# Patient Record
Sex: FEMALE | Race: WHITE | NOT HISPANIC OR LATINO | Employment: OTHER | URBAN - METROPOLITAN AREA
[De-identification: names, ages, dates, MRNs, and addresses within clinical notes are randomized per-mention and may not be internally consistent; named-entity substitution may affect disease eponyms.]

---

## 2019-01-16 ENCOUNTER — OFFICE VISIT (OUTPATIENT)
Dept: ORTHOPEDIC SURGERY | Facility: CLINIC | Age: 64
End: 2019-01-16

## 2019-01-16 ENCOUNTER — TELEPHONE (OUTPATIENT)
Dept: ORTHOPEDIC SURGERY | Facility: CLINIC | Age: 64
End: 2019-01-16

## 2019-01-16 VITALS — HEIGHT: 65 IN | BODY MASS INDEX: 19.36 KG/M2 | WEIGHT: 116.18 LBS

## 2019-01-16 DIAGNOSIS — M65.9 SYNOVITIS OF LEFT FOOT: Primary | ICD-10-CM

## 2019-01-16 DIAGNOSIS — M19.072 OSTEOARTHRITIS OF LEFT ANKLE OR FOOT: ICD-10-CM

## 2019-01-16 DIAGNOSIS — M18.12 ARTHRITIS OF CARPOMETACARPAL (CMC) JOINT OF LEFT THUMB: ICD-10-CM

## 2019-01-16 DIAGNOSIS — M24.575 CONTRACTURE OF JOINT OF FOOT, LEFT: ICD-10-CM

## 2019-01-16 PROBLEM — M19.079 OSTEOARTHRITIS OF ANKLE OR FOOT: Status: ACTIVE | Noted: 2019-01-16

## 2019-01-16 PROCEDURE — 99204 OFFICE O/P NEW MOD 45 MIN: CPT | Performed by: ORTHOPAEDIC SURGERY

## 2019-01-16 RX ORDER — ASPIRIN 81 MG/1
81 TABLET, CHEWABLE ORAL DAILY
COMMUNITY

## 2019-01-16 RX ORDER — DILTIAZEM HYDROCHLORIDE 120 MG/1
CAPSULE, EXTENDED RELEASE ORAL
COMMUNITY
Start: 2018-10-22

## 2019-01-16 NOTE — PROGRESS NOTES
NEW PATIENT    Patient: Lindsay Morales  : 1955    Primary Care Provider: Ariel Alex MD    Requesting Provider: As above    Pain of the Left Foot      History    Chief Complaint: Left forefoot pain    History of Present Illness: This is an extremely pleasant 63-year-old with left forefoot pain.  It started approximately 2018, she began to have pain in the second and third metatarsal phalangeal joints.  No specific injury.  She and her  are avid runner's, and they had completed a 5K race.  The pain is worse with walking barefoot, that her with padded shoe, worse with activity.  She does have a history of a fracture in that area 8 years ago, but had not had any symptoms since that time.  She had an x-ray done 18, which showed some mild arthritis, and then an MRI done 19, that shows synovitis in the second and third metatarsal phalangeal joint region, some arthritis in the first metatarsal phalangeal joint, old fracture of the proximal phalanx of the third toe.  I reviewed the reports and looked at the MRI.  I looked at her outside records, these are Dr. Alex's.  They also indicate left basal thumb pain, she is wearing a wrist splint.  She saw a hand surgeon at [facility].  She has been advised that she has CMC arthritis.  A compounded cream has been prescribed, she has not tried Voltaren gel.  She finds that the splint does help, but it's bulky.    Current Outpatient Medications on File Prior to Visit   Medication Sig Dispense Refill   • aspirin 81 MG chewable tablet Chew 81 mg Daily.     • Calcium Carbonate-Vit D-Min (CALCIUM 1200 PO) Take  by mouth.     • Calcium Polycarbophil (FIBER-CAPS PO) Take  by mouth.     • CARTIA  MG 24 hr capsule      • Magnesium Hydroxide (MAGNESIA PO) Take  by mouth.     • Multiple Vitamins-Minerals (MULTIVITAMIN ADULT PO) Take  by mouth.       No current facility-administered medications on file prior to visit.      "  Allergies   Allergen Reactions   • Penicillins Hives      Past Medical History:   Diagnosis Date   • Raynaud phenomenon      Past Surgical History:   Procedure Laterality Date   • COLONOSCOPY     • TUBAL ABDOMINAL LIGATION     • VEIN LIGATION AND STRIPPING       Family History   Problem Relation Age of Onset   • Cancer Mother    • Hypertension Mother    • Cancer Father       Social History     Socioeconomic History   • Marital status:      Spouse name: Not on file   • Number of children: Not on file   • Years of education: Not on file   • Highest education level: Not on file   Social Needs   • Financial resource strain: Not on file   • Food insecurity - worry: Not on file   • Food insecurity - inability: Not on file   • Transportation needs - medical: Not on file   • Transportation needs - non-medical: Not on file   Occupational History   • Not on file   Tobacco Use   • Smoking status: Never Smoker   • Smokeless tobacco: Never Used   Substance and Sexual Activity   • Alcohol use: No     Frequency: Never   • Drug use: Defer   • Sexual activity: Defer   Other Topics Concern   • Not on file   Social History Narrative   • Not on file        Review of Systems   Constitutional: Positive for activity change.   HENT: Negative.    Eyes: Negative.    Respiratory: Negative.    Cardiovascular: Negative.    Gastrointestinal: Negative.    Endocrine: Negative.    Genitourinary: Negative.    Musculoskeletal: Positive for joint swelling.   Skin: Negative.    Allergic/Immunologic: Negative.    Neurological: Negative.    Hematological: Negative.    Psychiatric/Behavioral: Negative.        The following portions of the patient's history were reviewed and updated as appropriate: allergies, current medications, past family history, past medical history, past social history, past surgical history and problem list.    Physical Exam:   Ht 165.1 cm (65\")   Wt 52.7 kg (116 lb 2.9 oz)   BMI 19.33 kg/m²   GENERAL: Body habitus: " normal weight for height    Lower extremity edema: Right: none; Leftt: none    Varicose veins:  Right: none; Left: none    Gait: antalgic     Mental Status:  awake and alert; oriented to person, place, and time    Voice:  clear  SKIN:  Normal    Hair Growth:  Right:normal; Left:  normal  NAILS: Toenails: normal  HEENT: Head: Normocephalic, atraumatic,  without obvious abnormality.  eye: normal external eye, no icterus  ears: normal external ears  nose: normal external nose  pharynx: dental hygiene adequate  PULM:  Repiratory effort normal  CV:  Dorsalis Pedis:  Right: 2+; Left:2+    Posterior Tibial: Right:2+; Left:2+    Capillary Refill:  Brisk  MSK:  Hand:right handed and Tender left thumb CMC      Tibia:  Right:  non tender; Left:  non tender      Ankle:  Right: non tender, ROM  normal and symmetric and motor function  normal; Left:  non tender, ROM  normal and symmetric and motor function  normal      Foot:  Right:  non tender, ROM  normal and motor function  normal; Left:  Very tender in the second metatarsal phalangeal joint, moderately tender in the third.  Swelling in both joints.  No significant deformity other than very slight increased distance between the second and third toes.  Stable to testing.  Nontender in the webspaces.  Mild to moderate hallux valgus, but no tenderness in the first metatarsal phalangeal joint, she does have slight decreased range of motion of the first metatarsal phalangeal joint.      NEURO: Heel Walking:  Right:  normal; Left:  normal    Toe Walking:  Right:  normal; Left:  limited ability, painful     Pinon-John Paul 5.07 monofilament test: normal    Lower extremity sensation: intact     Reflexes:  Biceps:  Right:  1+; Left:  1+           Quads:  Right:  2+; Left:  2+           Ankle:  Right:  2+; Left:  2+      Calf Atrophy:none    Motor Function: all 5/5         Medical Decision Making    Data Review:   ordered and reviewed x-rays today, reviewed radiology images, reviewed  radiology results and reviewed outside records    Assessment and Plan/ Diagnosis/Treatment options:   1. Synovitis of left foot  She has synovitis of the left second and third metatarsal phalangeal joints.  I explained that this is how hammertoes start.  I explained to the patient that this is a very common problem, it is commonly hereditary but is made worse by shoe wear.  It commonly presents as a feeling of walking on a marble, or a rock.  It is generally worse barefoot, feels better with padding .  I can be hard to distinguish this from neuroma pain, but neuroma pain is generally worse in a shoe and better barefoot.    If untreated, it can lead to severe deformity of the toe.      I explained the problem to the patient, that the swelling in the joint leads to loosening of the ligaments and gradual deformity.  I explained that the toe can move directly up or sideways and become a crossover toe.  I explained we can call this synovitis, metatarsalgia, early hammertoe, early crossover toe, the result is the same.  The goal of treatment is to prevent severe deformity.  We cannot change the deformity that has already occurred, but we can keep it from getting worse.  I explained that the MRI findings are consistent with this.    I explained it can take 6-12 months for the pain to resolve.  When the pain is gone, the toe will no longer deform.    The treatment is done in a step-wise fashion.  The first stage is three fold: 1. splinting the joint, I showed them how to use either tape or a 2 loop Budin splint.  They should experiment and use whichever is comfortable.  2. custom orthotic to relieve pressure on the joint- I gave them a prescription 3. antiinflammatory to decrease the inflammation, I recommend she take 2 Aleve tablets twice a day.  I wrote this down for her.  I recommend she do this for 12 weeks..      The second stage is a cortisone injection I do not do this the first time because of the increased risk  that the toe will dislocate after injection.  I explained that sometimes these joints can spontaneously dislocate.   .      Surgery is only a very last resort.    I will see her in 12 weeks, standing 2 views of the foot.    2. Contracture of joint of foot, left  As above this is early hammertoe    3. Osteoarthritis of left ankle or foot  She has some arthritic change in the first metatarsal phalangeal joint on the left, not significantly painful right now, but slightly stiffer than the right and that might have increase the likelihood of developing the second and third metatarsal synovitis.  She also has second toe longer than the great toe, which is more commonly prone to developing this synovitis and hammertoe    4. Arthritis of carpometacarpal (CMC) joint of left thumb  Left thumb basal arthritis, we discussed some other splints that might work for her, they might be less bulky.  I also suggested she consider Voltaren gel as being less expensive than a compounded cream, I have found useful for my thumb arthritis.  I sent a prescription to her pharmacy.

## 2019-01-16 NOTE — TELEPHONE ENCOUNTER
Patient called to ask how long she should take Aleve for. Dr. Hughes told her two Aleve a day, twice a day but didn't say for how long.

## 2019-01-17 NOTE — TELEPHONE ENCOUNTER
Called number on file for patient. No vm available to leave message. Will attempt later.     Terra

## 2019-01-21 ENCOUNTER — TELEPHONE (OUTPATIENT)
Dept: ORTHOPEDIC SURGERY | Facility: CLINIC | Age: 64
End: 2019-01-21

## 2019-01-21 NOTE — TELEPHONE ENCOUNTER
PATIENT NEEDS A NEW RX FOR ORTHOTICS FOR HER LEFT FOOT. SHE LOST THE PHONE DR. CASTILLO GAVE HER LAST WEEK. PLEASE FAX [phone].

## 2019-01-23 NOTE — TELEPHONE ENCOUNTER
Bilateral full length custom orthotics, on left post metatarsals 2/3,  Diagnosis metatarsalgia, synovitis

## 2019-01-23 NOTE — TELEPHONE ENCOUNTER
The patient called back about her RX, she said no one has returned her call from Monday about her Orthotics.

## 2019-04-17 ENCOUNTER — OFFICE VISIT (OUTPATIENT)
Dept: ORTHOPEDIC SURGERY | Facility: CLINIC | Age: 64
End: 2019-04-17

## 2019-04-17 VITALS — WEIGHT: 121.03 LBS | OXYGEN SATURATION: 98 % | BODY MASS INDEX: 20.17 KG/M2 | HEART RATE: 94 BPM | HEIGHT: 65 IN

## 2019-04-17 DIAGNOSIS — M05.79 RHEUMATOID ARTHRITIS INVOLVING MULTIPLE SITES WITH POSITIVE RHEUMATOID FACTOR (HCC): ICD-10-CM

## 2019-04-17 DIAGNOSIS — M65.9 SYNOVITIS OF LEFT FOOT: Primary | ICD-10-CM

## 2019-04-17 DIAGNOSIS — M18.12 ARTHRITIS OF CARPOMETACARPAL (CMC) JOINT OF LEFT THUMB: ICD-10-CM

## 2019-04-17 PROCEDURE — 99213 OFFICE O/P EST LOW 20 MIN: CPT | Performed by: ORTHOPAEDIC SURGERY

## 2019-04-17 RX ORDER — METRONIDAZOLE 7.5 MG/G
GEL TOPICAL 2 TIMES DAILY
COMMUNITY

## 2019-04-17 NOTE — PROGRESS NOTES
ESTABLISHED PATIENT    Patient: Lindsay Morales  : 1955    Primary Care Provider: Ariel Alex MD    Requesting Provider: As above    Follow-up (3 month follow up; Synovitis of left foot)      History    Chief Complaint: Synovitis left forefoot    History of Present Illness: She returns reporting some improvement in the left forefoot.  She got the orthotics and found them very helpful.  She has been splinting the toe.  However since I last saw her she has developed a new and significant problem.  She woke up one morning with severe stiffness in her hands and feet.  This lasted for most of the day.  And it persisted.  She saw Dr. Bergeorn, rheumatology at [facility], and has been diagnosed with rheumatoid arthritis.  Her rheumatoid factor is elevated at 39.8.  She brought her labs with her today.  They are dated 3/8/2019.  She also has an elevated anti-CCP, she has developed classic rheumatoid arthritis.  She has been started on methotrexate and steroids.  She reports she felt significantly better with these medications.  The steroids are dosed in a tapering fashion, and she notes as the dosages decreasing she is having some symptoms again in the hands and feet.  She has not been able to run.  She wanted to know if she could ever run again.  I explained that I would not recommend running until there is no synovitis.  We talked about biking and swimming.  We also talked about injection, she would like to hold off on that until she sees how much improvement she will get with rheumatoid medications.    Current Outpatient Medications on File Prior to Visit   Medication Sig Dispense Refill   • aspirin 81 MG chewable tablet Chew 81 mg Daily.     • Calcium Carbonate-Vit D-Min (CALCIUM 1200 PO) Take  by mouth.     • Calcium Polycarbophil (FIBER-CAPS PO) Take  by mouth.     • CARTIA  MG 24 hr capsule      • denosumab (PROLIA) 60 MG/ML solution syringe Inject  under the skin into the appropriate area  as directed 1 (One) Time.     • diclofenac (VOLTAREN) 1 % gel gel Apply 4 g topically to the appropriate area as directed 4 (Four) Times a Day As Needed (thumb and foot arthritis). 5 tube 5   • Magnesium Hydroxide (MAGNESIA PO) Take  by mouth.     • methotrexate 2.5 MG tablet Take  by mouth 3 (Three) Doses Each Week. Take Doses 12 (Twelve) Hours Apart.     • metroNIDAZOLE (METROGEL) 0.75 % gel Apply  topically to the appropriate area as directed 2 (Two) Times a Day.     • Multiple Vitamins-Minerals (MULTIVITAMIN ADULT PO) Take  by mouth.       No current facility-administered medications on file prior to visit.       Allergies   Allergen Reactions   • Penicillins Hives      Past Medical History:   Diagnosis Date   • Raynaud phenomenon      Past Surgical History:   Procedure Laterality Date   • COLONOSCOPY     • TUBAL ABDOMINAL LIGATION     • VEIN LIGATION AND STRIPPING       Family History   Problem Relation Age of Onset   • Cancer Mother    • Hypertension Mother    • Cancer Father       Social History     Socioeconomic History   • Marital status:      Spouse name: Not on file   • Number of children: Not on file   • Years of education: Not on file   • Highest education level: Not on file   Tobacco Use   • Smoking status: Never Smoker   • Smokeless tobacco: Never Used   Substance and Sexual Activity   • Alcohol use: No     Frequency: Never   • Drug use: Defer   • Sexual activity: Defer        Review of Systems   Constitutional: Positive for activity change.   HENT: Negative.    Eyes: Negative.    Respiratory: Negative.    Cardiovascular: Negative.    Gastrointestinal: Negative.    Endocrine: Negative.    Genitourinary: Negative.    Musculoskeletal: Positive for arthralgias.   Skin: Negative.    Allergic/Immunologic: Negative.    Neurological: Negative.    Hematological: Negative.    Psychiatric/Behavioral: Negative.        The following portions of the patient's history were reviewed and updated as  "appropriate: allergies, current medications, past family history, past medical history, past social history, past surgical history and problem list.    Physical Exam:   Pulse 94   Ht 165.1 cm (65\")   Wt 54.9 kg (121 lb 0.5 oz)   SpO2 98%   Breastfeeding? No   BMI 20.14 kg/m²   GENERAL: Body habitus: normal weight for height    Lower extremity edema: Left: none; Right: none    Gait: normal     Mental Status:  awake and alert; oriented to person, place, and time  MSK:  Tibia:  Right:  non tender; Left:  non tender        Ankle:  Right: non tender; Left:  non tender        Foot:  Right:  non tender; Left:  Very tender in the second metatarsal phalangeal joint with moderate synovitis, no new deformity    NEURO Sensation:  intact     Medical Decision Making    Data Review:   ordered and reviewed x-rays today    Assessment/Plan/Diagnosis/Treatment Options:   1. Synovitis of left foot  She is improved but not resolved.  I would recommend she continue splinting the toe.  I would recommend she continue the orthotics.  We discussed injection, but she would like to hold off on that right now.  Her repeat x-rays do not show any dislocation, no erosions around the joints.  We will leave her follow-up open-ended.  If she does not improve enough with the rheumatoid medications she will return and we will inject it.  We discussed activity including biking and swimming, I would not recommend jogging or running until there is no synovitis in the feet.  I will be happy to see her anytime  - XR Foot 2 View Left    2. Rheumatoid arthritis involving multiple sites with positive rheumatoid factor (CMS/HCC)  New diagnosis, improved with medication but slowly worsening as the steroid tapers    3. Arthritis of carpometacarpal (CMC) joint of left thumb  Improved with medication                            "